# Patient Record
Sex: MALE | Race: WHITE | NOT HISPANIC OR LATINO | Employment: OTHER | URBAN - METROPOLITAN AREA
[De-identification: names, ages, dates, MRNs, and addresses within clinical notes are randomized per-mention and may not be internally consistent; named-entity substitution may affect disease eponyms.]

---

## 2018-03-20 ENCOUNTER — HISTORICAL (OUTPATIENT)
Dept: LAB | Facility: HOSPITAL | Age: 78
End: 2018-03-20

## 2018-03-20 LAB — PSA SERPL-MCNC: <0.01 NG/ML (ref 0–4)

## 2018-09-25 ENCOUNTER — HISTORICAL (OUTPATIENT)
Dept: ADMINISTRATIVE | Facility: HOSPITAL | Age: 78
End: 2018-09-25

## 2018-12-12 ENCOUNTER — HOSPITAL ENCOUNTER (OUTPATIENT)
Dept: ADMINISTRATIVE | Facility: HOSPITAL | Age: 78
End: 2018-12-14
Attending: INTERNAL MEDICINE | Admitting: INTERNAL MEDICINE

## 2018-12-12 LAB
ABS NEUT (OLG): 5.92 X10(3)/MCL (ref 2.1–9.2)
ALBUMIN SERPL-MCNC: 3.9 GM/DL (ref 3.4–5)
ALBUMIN/GLOB SERPL: 1.6 {RATIO}
ALP SERPL-CCNC: 98 UNIT/L (ref 50–136)
ALT SERPL-CCNC: 89 UNIT/L (ref 12–78)
AMPHET UR QL SCN: NORMAL
APTT PPP: 24.7 SECOND(S) (ref 24.8–36.9)
AST SERPL-CCNC: 56 UNIT/L (ref 15–37)
BARBITURATE SCN PRESENT UR: NORMAL
BASOPHILS # BLD AUTO: 0.1 X10(3)/MCL (ref 0–0.2)
BASOPHILS NFR BLD AUTO: 1 %
BENZODIAZ UR QL SCN: NORMAL
BILIRUB SERPL-MCNC: 1.4 MG/DL (ref 0.2–1)
BILIRUBIN DIRECT+TOT PNL SERPL-MCNC: 0.2 MG/DL (ref 0–0.2)
BILIRUBIN DIRECT+TOT PNL SERPL-MCNC: 1.2 MG/DL (ref 0–0.8)
BUN SERPL-MCNC: 21 MG/DL (ref 7–18)
CALCIUM SERPL-MCNC: 9.1 MG/DL (ref 8.5–10.1)
CANNABINOIDS UR QL SCN: NORMAL
CHLORIDE SERPL-SCNC: 103 MMOL/L (ref 98–107)
CHOLEST SERPL-MCNC: 140 MG/DL (ref 0–200)
CHOLEST/HDLC SERPL: 4.7 {RATIO} (ref 0–5)
CK SERPL-CCNC: 65 UNIT/L (ref 39–308)
CO2 SERPL-SCNC: 32 MMOL/L (ref 21–32)
COCAINE UR QL SCN: NORMAL
CREAT SERPL-MCNC: 1.1 MG/DL (ref 0.7–1.3)
CRP SERPL HS-MCNC: 2.93 MG/L (ref 0–3)
EOSINOPHIL # BLD AUTO: 0.2 X10(3)/MCL (ref 0–0.9)
EOSINOPHIL NFR BLD AUTO: 2 %
ERYTHROCYTE [DISTWIDTH] IN BLOOD BY AUTOMATED COUNT: 13.8 % (ref 11.5–17)
ERYTHROCYTE [SEDIMENTATION RATE] IN BLOOD: 2 MM/HR (ref 0–15)
EST. AVERAGE GLUCOSE BLD GHB EST-MCNC: 180 MG/DL
GLOBULIN SER-MCNC: 2.4 GM/DL (ref 2.4–3.5)
GLUCOSE SERPL-MCNC: 156 MG/DL (ref 74–106)
HBA1C MFR BLD: 7.9 % (ref 4.2–6.3)
HCT VFR BLD AUTO: 52.3 % (ref 42–52)
HDLC SERPL-MCNC: 30 MG/DL (ref 35–60)
HGB BLD-MCNC: 16.7 GM/DL (ref 14–18)
INR PPP: 1 (ref 0–1.3)
LDLC SERPL CALC-MCNC: 41 MG/DL (ref 0–129)
LYMPHOCYTES # BLD AUTO: 2.2 X10(3)/MCL (ref 0.6–4.6)
LYMPHOCYTES NFR BLD AUTO: 24 %
MCH RBC QN AUTO: 29.3 PG (ref 27–31)
MCHC RBC AUTO-ENTMCNC: 31.9 GM/DL (ref 33–36)
MCV RBC AUTO: 91.9 FL (ref 80–94)
MONOCYTES # BLD AUTO: 0.8 X10(3)/MCL (ref 0.1–1.3)
MONOCYTES NFR BLD AUTO: 8 %
NEUTROPHILS # BLD AUTO: 5.92 X10(3)/MCL (ref 2.1–9.2)
NEUTROPHILS NFR BLD AUTO: 64 %
OPIATES UR QL SCN: NORMAL
PCP UR QL: NEGATIVE
PH UR STRIP.AUTO: 7 [PH] (ref 5–7.5)
PLATELET # BLD AUTO: 205 X10(3)/MCL (ref 130–400)
PMV BLD AUTO: 10.4 FL (ref 9.4–12.4)
POTASSIUM SERPL-SCNC: 4.2 MMOL/L (ref 3.5–5.1)
PROT SERPL-MCNC: 6.3 GM/DL (ref 6.4–8.2)
PROTHROMBIN TIME: 13.3 SECOND(S) (ref 12.2–14.7)
RBC # BLD AUTO: 5.69 X10(6)/MCL (ref 4.7–6.1)
SODIUM SERPL-SCNC: 143 MMOL/L (ref 136–145)
SP GR FLD REFRACTOMETRY: 1.01 (ref 1–1.03)
TRIGL SERPL-MCNC: 343 MG/DL (ref 30–150)
TROPONIN I SERPL-MCNC: <0.02 NG/ML (ref 0.02–0.49)
VLDLC SERPL CALC-MCNC: 69 MG/DL
WBC # SPEC AUTO: 9.2 X10(3)/MCL (ref 4.5–11.5)

## 2018-12-13 LAB
ABS NEUT (OLG): 4.87 X10(3)/MCL (ref 2.1–9.2)
BASOPHILS # BLD AUTO: 0.1 X10(3)/MCL (ref 0–0.2)
BASOPHILS NFR BLD AUTO: 1 %
BUN SERPL-MCNC: 16 MG/DL (ref 7–18)
CALCIUM SERPL-MCNC: 9 MG/DL (ref 8.5–10.1)
CHLORIDE SERPL-SCNC: 103 MMOL/L (ref 98–107)
CO2 SERPL-SCNC: 30 MMOL/L (ref 21–32)
CREAT SERPL-MCNC: 0.7 MG/DL (ref 0.7–1.3)
CREAT/UREA NIT SERPL: 22.9
EOSINOPHIL # BLD AUTO: 0.2 X10(3)/MCL (ref 0–0.9)
EOSINOPHIL NFR BLD AUTO: 3 %
ERYTHROCYTE [DISTWIDTH] IN BLOOD BY AUTOMATED COUNT: 13.6 % (ref 11.5–17)
GLUCOSE SERPL-MCNC: 148 MG/DL (ref 74–106)
HCT VFR BLD AUTO: 50 % (ref 42–52)
HGB BLD-MCNC: 16.3 GM/DL (ref 14–18)
LYMPHOCYTES # BLD AUTO: 2.2 X10(3)/MCL (ref 0.6–4.6)
LYMPHOCYTES NFR BLD AUTO: 27 %
MCH RBC QN AUTO: 29.5 PG (ref 27–31)
MCHC RBC AUTO-ENTMCNC: 32.6 GM/DL (ref 33–36)
MCV RBC AUTO: 90.6 FL (ref 80–94)
MONOCYTES # BLD AUTO: 0.8 X10(3)/MCL (ref 0.1–1.3)
MONOCYTES NFR BLD AUTO: 9 %
NEUTROPHILS # BLD AUTO: 4.87 X10(3)/MCL (ref 2.1–9.2)
NEUTROPHILS NFR BLD AUTO: 60 %
PLATELET # BLD AUTO: 185 X10(3)/MCL (ref 130–400)
PMV BLD AUTO: 10.6 FL (ref 9.4–12.4)
POTASSIUM SERPL-SCNC: 3.9 MMOL/L (ref 3.5–5.1)
RBC # BLD AUTO: 5.52 X10(6)/MCL (ref 4.7–6.1)
SODIUM SERPL-SCNC: 141 MMOL/L (ref 136–145)
TSH SERPL-ACNC: 2.4 MIU/L (ref 0.36–3.74)
WBC # SPEC AUTO: 8.1 X10(3)/MCL (ref 4.5–11.5)

## 2019-01-28 ENCOUNTER — HISTORICAL (OUTPATIENT)
Dept: ADMINISTRATIVE | Facility: HOSPITAL | Age: 79
End: 2019-01-28

## 2019-01-28 LAB — POTASSIUM SERPL-SCNC: 4.7 MMOL/L (ref 3.5–5.1)

## 2021-01-29 ENCOUNTER — HISTORICAL (OUTPATIENT)
Dept: ADMINISTRATIVE | Facility: HOSPITAL | Age: 81
End: 2021-01-29

## 2021-01-29 LAB — POTASSIUM SERPL-SCNC: 6 MMOL/L (ref 3.5–5.1)

## 2021-11-09 ENCOUNTER — HISTORICAL (OUTPATIENT)
Dept: RADIOLOGY | Facility: HOSPITAL | Age: 81
End: 2021-11-09

## 2021-11-09 LAB — POC CREATININE: 0.8 MG/DL (ref 0.6–1.3)

## 2021-12-01 ENCOUNTER — HISTORICAL (OUTPATIENT)
Dept: ADMINISTRATIVE | Facility: HOSPITAL | Age: 81
End: 2021-12-01

## 2022-04-30 NOTE — OP NOTE
DATE OF SURGERY:9-25-18    SURGEON:  Viviana Mcguire MD    PREOPERATIVE DIAGNOSIS:  Nucleosclerotic cataract of the right eye.    POST OPERATIVE DIAGNOSIS:  Nucleosclerotic cataract of the right eye.    PROCEDURE:  Cataract extraction with intraocular lens implantation of the right eye.    ANESTHESIA:  Local plus MAC.    COMPLICATIONS:  None.    ESTIMATED BLOOD LOSS:  None.    After discussion of risks, benefits and alternative with the patient and family, informed consent was obtained by the patient in clinic including but not limited to bleeding, infection, decreased vision, loss of the eye, need for subsequent surgery.  The patient understands and wishes to proceed.    PROCEDURE IN DETAIL:    The patient was brought into the operating room and laid in supine manner.  After anesthesia was undertaken without complication, the operative eye and periorbital region were prepped and draped in usual manner for intraocular surgery while a speculum was placed in the operative eye for adequate exposure. A paracentesis incision was made at approximately 11 o'clock with a clear cornea at the limbus. Preservative free Lidocaine and epinephrine was injected into the anterior chamber followed by viscoelastic.  A triplanar cataract wound was then created approximately 7 o'clock through clear cornea at the limbus.  Curvilinear capsulorrhexis was created without complication.     BSS sonic cannula was used to hydrodissect the lens.  The lens was found to move freely within the capsular bag.  Lens was then removed in divide and conquer technique.  Remaining cortical material was removed with I&A handpiece.  A 20.5 diopter ZCBOO lens was then implanted into the capsular bag. The remaining viscoelastic was then removed with the irrigation aspiration handpiece. The wounds were hydrated and postop injections given.  The patient tolerated the procedure well.  Eyelid speculum was removed followed by pressure patch and shield.  The  patient was turned over to anesthesia in stable condition.

## 2022-04-30 NOTE — ED PROVIDER NOTES
Patient:   Junior Maynor             MRN: [id number]            FIN: [id number]               Age:   78 years     Sex:  Male     :  1940   Associated Diagnoses:   CVA (cerebral vascular accident)   Author:   Li OSEGUERA MD, Timi BONILLA      Basic Information   Time seen: Date & time 2018 16:47:00.   History source: Patient.   Arrival mode: Private vehicle, walking.   History limitation: None.   Additional information: Patient's physician(s): Viviana Mcguire MD, Chief Complaint from Nursing Triage Note : Chief Complaint   2018 16:44 CST     Chief Complaint           Pt wife states pt sent here by eye doctor for ct scan. Pt having double vision and headache since yesterday . No nausea or vomitting.  .      History of Present Illness   The patient presents with headache, 78-year-old white male with complaints of double vision and headache since yesterday.  Seen by Dr. Mcguire ophthalmologist today and referred here for CT head.  No focal neurological deficits.  Denies fever only for rigidity or vomiting.  Denies any injury or trauma . Faustina Marshall, NP SORT NOTE and     I, Dr. Jefferson, assumed care of this patient at 1816.  78 year old male with history of HTN, HLD, CAD, MI, and RA presents to the ED sent by ophthalmologist for head CT. Patient complains of sudden onset of double vision yesterday. He also reports slight headache. He reports thinking that blurred vision was due to straining eyes to see. Patient denies nausea, trouble speaking, new weakness, CP, and SOB. He reports that his gait is normal for him. Patient reports history of right eye cataract surgery. He denies contacts. Patient does not smoke. .  The onset was 1  days ago.  The course/duration of symptoms is constant.  Location: generalized. Radiating pain: none. The character of symptoms is Pain.  The degree at onset was minimal.  The degree at maximum was minimal.  The degree at present is minimal.  The exacerbating factor  is none.  The relieving factor is none.  Risk factors consist of hypertension.  Prior episodes: none.  Therapy today: doctor's office visit.  Preceding symptoms: none.  Associated symptoms: double vision, no nausea, no trouble speaking or walking, no new weakness, no CP, no SOB.        Review of Systems   Constitutional symptoms:  No fever, no chills, no sweats, no weakness.    Skin symptoms:  No rash,    Eye symptoms:  Double vision.   ENMT symptoms:  No ear pain,    Respiratory symptoms:  No shortness of breath, no orthopnea.    Cardiovascular symptoms:  No chest pain, no palpitations.    Gastrointestinal symptoms:  No abdominal pain, no nausea, no vomiting, no diarrhea.    Genitourinary symptoms:  No dysuria, no hematuria.    Musculoskeletal symptoms:  No back pain, no Muscle pain.    Neurologic symptoms:  Headache, No trouble speaking, no trouble walking, no new weakness.    Psychiatric symptoms:  No anxiety, no depression.    Allergy/immunologic symptoms:  No seasonal allergies, no food allergies.              Additional review of systems information: All other systems reviewed and otherwise negative.      Health Status   Allergies:    Allergic Reactions (Selected)  No Known Allergies.   Medications:  (Selected)   Documented Medications  Documented  Norco 7.5 mg-325 mg oral tablet: 1 tab(s), Oral, BID, 0 Refill(s)  Plavix 75 mg oral tablet: 75 mg = 1 tab(s), Oral, Daily, # 30 tab(s), 0 Refill(s)  amlodipine 5 mg oral tablet: 5 mg = 1 tab(s), Oral, BID, 0 Refill(s)  aspirin 81 mg oral tablet: 81 mg = 1 tab(s), Oral, Daily, # 30 tab(s), 0 Refill(s)  atorvastatin 10 mg oral tablet: 20 mg = 2 tab(s), Oral, Daily, 0 Refill(s)  diclofenac sodium 75 mg oral delayed release tablet: 75 mg = 1 tab(s), Oral, BID, # 60 tab(s), 0 Refill(s)  isosorbide MONOnitrate 30 mg oral tablet, Extended Release: 30 mg = 1 tab(s), Oral, qAM, 0 Refill(s)  losartan 100 mg oral tablet: 100 mg = 1 tab(s), Oral, At Bedtime, 0  Refill(s)  metoprolol tartrate 50 mg oral tab: 50 mg = 1 tab(s), Oral, BID, 0 Refill(s)  nitroglycerin 0.4 mg sublingual TAB: 0.4 mg = 1 tab(s), SL, q5min, PRN PRN for chest pain, 0 Refill(s).   Immunizations: Up to date.      Past Medical/ Family/ Social History   Medical history:    Resolved  Prostate cancer ([id number]):  Resolved.  CAD (coronary atherosclerotic disease) ([id number]):  Resolved.  MI, old ([id number]):  Resolved.  Hyperlipidemia ([id number]):  Resolved.  Hypertension (30959222):  Resolved.  Urinary incontinence ([id number]):  Resolved..   Surgical history:    99497 - RT CATARACT W/IOL 1 STA PHACO (Right) performed by Viviana Mcguire MD on 9/25/2018 at 78 Years.  Comments:  9/25/2018 13:00 - Erum Eagle RN  auto-populated from documented surgical case  Cervical Fusion Anterior (.) performed by Sreekanth Serrano MD on 8/5/2016 at 76 Years.  Comments:  8/5/2016 19:29 - Sami Mott RN  auto-populated from documented surgical case  Left Heart Cath w/COR Angio Ventriculography (None) performed by Jean Pierre Mina MD on 1/7/2016 at 75 Years.  Comments:  1/7/2016 19:37 - Mateus Ferro RN  auto-populated from documented surgical case  Place Drug Eluting Stent Perc Single (None) performed by Jean Pierre Mina MD on 1/7/2016 at 75 Years.  Comments:  1/7/2016 19:37 - Mateus Ferro RN  auto-populated from documented surgical case  Prostatectomy (SNOMED CT 044078030).  Cholecystectomy (SNOMED CT 12925282).  Tonsillectomy (SNOMED CT 529439903).  stent cardiac placement.  Bladder..   Family history:    No family history items have been selected or recorded..   Social history: Alcohol use: Denies, Tobacco use: Denies, Drug use: Denies, Occupation: On disability.      Physical Examination               Vital Signs   Vital Signs   12/12/2018 16:44 CST     Temperature Oral          36.5 DegC                             Temperature  Oral (calculated)             97.70 DegF                             Peripheral Pulse Rate     64 bpm                             Respiratory Rate          18 br/min                             SpO2                      96 %                             Oxygen Therapy            Room air                             Systolic Blood Pressure   157 mmHg  HI                             Diastolic Blood Pressure  77 mmHg  .   Measurements   12/12/2018 16:44 CST     Weight Dosing             93 kg                             Weight Measured and Calculated in Lbs     205.03 lb                             Weight Estimated          93 kg                             Height/Length Dosing      165 cm                             Height/Length Estimated   165 cm                             Body Mass Index Estimated 34.16 kg/m2  .   Basic Oxygen Information   12/12/2018 16:44 CST     SpO2                      96 %                             Oxygen Therapy            Room air  .   General:  Alert, no acute distress.    Skin:  Warm, pink, intact, moist, no rash.    Head:  Normocephalic, atraumatic.    Neck:  Supple.   Eye:  Pupils are equal, round and reactive to light, normal conjunctiva, Unable to abduct right eye.    Cardiovascular:  Regular rate and rhythm, No murmur, Normal peripheral perfusion, No edema.    Respiratory:  Lungs are clear to auscultation, respirations are non-labored, breath sounds are equal, Symmetrical chest wall expansion.    Gastrointestinal:  Soft, Nontender, Non distended, Normal bowel sounds.    Musculoskeletal:  Normal ROM, normal strength.    Neurological:  Alert and oriented to person, place, time, and situation, No focal neurological deficit observed, CN II-XII intact, normal sensory observed, normal motor observed, normal speech observed, normal coordination observed.    Lymphatics:  No lymphadenopathy.   Psychiatric:  Cooperative, appropriate mood & affect, normal judgment.       Medical Decision  Making   Documents reviewed:  Emergency department nurses' notes.   Orders  Launch Order Profile (Selected)   Inpatient Orders  Completed  Automated Diff: STAT collect, 12/12/18 17:14:00 CST, Blood, Collected, Stop date 12/12/18 17:14:00 CST, Lab Collect, Print Label By Order Location, 12/12/18 16:46:00 CST  CBC - includes Diff: STAT collect, 12/12/18 17:14:41 CST, BLOOD, Collected, Stop date 12/12/18 17:14:00 CST, Lab Collect  CMP: STAT collect, 12/12/18 17:14:41 CST, BLOOD, Collected, Stop date 12/12/18 17:14:00 CST, Lab Collect  CT Head W/O Contrast: Stat, 12/12/18 16:46:00 CST, Other (please specify), double vision, None, Stretcher, Patient Has IV?, Rad Type, Schedule this test, 12/12/18 16:46:00 CST  Estimated Glomerular Filtration Rate: STAT collect, 12/12/18 17:14:00 CST, Blood, Collected, Stop date 12/12/18 17:14:00 CST, Lab Collect, Print Label By Order Location, 12/12/18 16:46:00 CST  PT: STAT collect, 12/12/18 17:14:41 CST, BLOOD, Collected, Stop date 12/12/18 17:14:00 CST, Lab Collect  PTT: STAT collect, 12/12/18 17:14:41 CST, BLOOD, Collected, Stop date 12/12/18 17:14:00 CST, Lab Collect.   Electrocardiogram:  Normal sinus rhythm, No ST-T changes, no ectopy, normal SD & QRS intervals, EP Interp.    Results review:  Lab results : Lab View   12/12/2018 17:14 CST     Sodium Lvl                143 mmol/L                             Potassium Lvl             4.2 mmol/L                             Chloride                  103 mmol/L                             CO2                       32.0 mmol/L                             Calcium Lvl               9.1 mg/dL                             Glucose Lvl               156 mg/dL  HI                             BUN                       21.0 mg/dL  HI                             Creatinine                1.10 mg/dL                             eGFR-AA                   >60 mL/min/1.73 m2  NA                             eGFR-KENAN                  >60 mL/min/1.73 m2   NA                             Bili Total                1.4 mg/dL  HI                             Bili Direct               0.20 mg/dL                             Bili Indirect             1.20 mg/dL  HI                             AST                       56 unit/L  HI                             ALT                       89 unit/L  HI                             Alk Phos                  98 unit/L                             Total Protein             6.3 gm/dL  LOW                             Albumin Lvl               3.90 gm/dL                             Globulin                  2.40 gm/dL                             A/G Ratio                 1.6  NA                             PT                        13.3 second(s)                             INR                       1.0                             PTT                       24.7 second(s)  LOW                             WBC                       9.2 x10(3)/mcL                             RBC                       5.69 x10(6)/mcL                             Hgb                       16.7 gm/dL                             Hct                       52.3 %  HI                             Platelet                  205 x10(3)/mcL                             MCV                       91.9 fL                             MCH                       29.3 pg                             MCHC                      31.9 gm/dL  LOW                             RDW                       13.8 %                             MPV                       10.4 fL                             Abs Neut                  5.92 x10(3)/mcL                             Neutro Auto               64 %  NA                             Lymph Auto                24 %  NA                             Mono Auto                 8 %  NA                             Eos Auto                  2 %  NA                             Abs Eos                   0.2 x10(3)/mcL                             Basophil  Auto             1 %  NA                             Abs Neutro                5.92 x10(3)/mcL                             Abs Lymph                 2.2 x10(3)/mcL                             Abs Mono                  0.8 x10(3)/mcL                             Abs Baso                  0.1 x10(3)/mcL  .   Radiology results:  Rad Results (ST)  < 12 hrs   Accession: [id number]  Order: CT Head W/O Contrast  Report Dt/Tm: 12/12/2018 17:43  Report:      EXAM: CT Head W/O Contrast     INDICATION: double vision; headache     TECHNIQUE: Axial computed tomographic imaging of the head is obtained  without the administration of intravenous contrast. Automated exposure  control is utilized to reduce patient radiation dose.     COMPARISON: MRI brain on 11/17/2014     FINDINGS: The ventricles, sulci and cisterns are normal in size and  contour without hydrocephalus. There is no acute intracranial  hemorrhage, mass effect or midline shift. No large territorial  hypodensity or abnormal extra-axial fluid collection is identified.  Mild scattered patchy hypodensities are noted in the periventricular  and deep white matter. A partially calcified mass along the left  aspect of the mid falx measures up to 0.9 cm in diameter and  correlates with the previously demonstrated meningioma. The cerebellar  tonsils are normal in position. Calcified atherosclerotic plaque is  noted along the bilateral carotid siphons. A right lens replacement is  noted. The orbits are otherwise symmetric. There is a mucous retention  cyst or polyp in the left frontal sinus. The remaining visualized  cranial sinuses and mastoid air cells are well aerated. There is no  depressed calvarial fracture.        IMPRESSION:   1. No acute intracranial abnormality identified.  2. Mild chronic microvascular ischemic changes.  3. Partially calcified left parafalcine meningioma.    .      Reexamination/ Reevaluation   Time: 12/12/2018 20:11:00 .   Interventions: Patient  resting comfortably discussed case with Dr. Shepard recommends admission to hospitalist for formal stroke evaluation.      Impression and Plan   Diagnosis   CVA (cerebral vascular accident) ([id number] I63.9)   Plan   Condition: Improved, Stable.    Disposition: Medically cleared, Admit time  12/12/2018 20:13:00, Admit to Inpatient Unit.    Follow up with: Primary Care Physician.    Counseled: Patient, Regarding diagnosis, Regarding diagnostic results, Regarding treatment plan, Regarding prescription, Patient indicated understanding of instructions.    Notes: I, Divya Brink, acted solely as a scribe for and in the presence of Dr. Jefferson who performed the service.,       This scribes note accurately reflects the work done by me I have reviewed the note and personally performed a history and physical and agree with all the documentation and findings.

## 2022-04-30 NOTE — H&P
Patient:   Junior Maynor             MRN: [id number]            FIN: [id number]               Age:   78 years     Sex:  Male     :  1940   Associated Diagnoses:   CAD (coronary atherosclerotic disease); CVA (cerebral vascular accident); Double vision; Headache; Hyperlipidemia; Hypertension   Author:   Harish Gonzalez MD      Basic Information   Admit information:  Double vision headache htn .    Source of history:  Self.    Referral source:  Self.    History limitation:  None.       Chief Complaint   2018 16:44 CST     Pt wife states pt sent here by eye doctor for ct scan. Pt having double vision and headache since yesterday . No nausea or vomitting.        History of Present Illness             The patient presents with A 78 year old  male presented with c/o headache with double vision with h/o htn saw his ophthalmologist and was asked to get a ctscan in view of these symptoms he was evaluated in the er and admitted for tia vs cva with neuro consult .        Review of Systems   Has per HPI otherwise all systems reviewed and negative.   Constitutional:  Weakness, Fatigue, Decreased activity.    Eye:  Double vision, Visual disturbances.    Ear/Nose/Mouth/Throat:  Negative.    Respiratory:  Negative.    Cardiovascular:  Negative.    Gastrointestinal:  Negative.    Genitourinary:  Negative.    Hematology/Lymphatics:  Negative.    Endocrine:  Negative.    Immunologic:  Malaise.    Musculoskeletal:  Decreased range of motion.    Integumentary:  Negative.    Neurologic:  Headache.    Psychiatric:  Negative.    All other systems are negative      Health Status   Allergies:    Allergic Reactions (All)  No Known Allergies,    Allergies (1) Active Reaction  No Known Allergies None Documented   Current medications:  (Selected)   Inpatient Medications  Ordered  Lovenox 40 mg/0.4 mL subcutaneous solution: 40 mg, form: Injection, Subcutaneous, Daily, first dose 18 9:00:00 CST  Plavix 75 mg  oral tablet: 75 mg, form: Tab, Oral, Daily, first dose 12/13/18 9:00:00 CST  amlodipine 5 mg oral tablet: 5 mg, form: Tab, Oral, BID, first dose 12/12/18 21:00:00 CST  aspirin 325 mg oral tablet: 325 mg, form: Tab, Oral, Daily, first dose 12/12/18 21:58:00 CST, 4 chew tab = 5 grains  atorvastatin: 20 mg, form: Tab, Oral, Daily, first dose 12/13/18 17:00:00 CST  hydrALAZINE 20 mg/mL injectable solution: 10 mg, form: Injection, IV Push, q4hr PRN for hypertension, first dose 12/12/18 18:52:00 CST, STAT, SBP >220; DBP >120; (Use only if labetalol fails to maintain blood pressure parameters)  isosorbide MONOnitrate 30 mg oral tablet, Extended Release: 30 mg, form: Tab-ER, Oral, qAM, first dose 12/13/18 6:00:00 CST  labetalol: 10 mg, form: Soln, IV Push, q10min PRN for hypertension, first dose 12/12/18 18:52:00 CST, STAT, SBP > 220 or DBP > 120 over 1-2 minutes, not exceed 300 mg in 24-hours  losartan: 100 mg, form: Tab, Oral, At Bedtime, first dose 12/12/18 21:00:00 CST  metoprolol tartrate 50 mg oral tab: 50 mg, form: Tab, Oral, BID, first dose 12/12/18 21:00:00 CST  Documented Medications  Documented  ATORVASTATIN 20 MG TABLET: 20 mg = 1 tab(s), Daily  FLUZONE HIGH-DOSE 2018-19 SYR:   KETOROLAC 0.4% OPHTH SOLUTION:   MOXIFLOXACIN 0.5% EYE DROPS:   Norco 7.5 mg-325 mg oral tablet: 1 tab(s), Oral, BID, 0 Refill(s)  PREDNISOLONE AC 1% EYE DROP:   Plavix 75 mg oral tablet: 75 mg = 1 tab(s), Oral, Daily, # 30 tab(s), 0 Refill(s)  amlodipine 5 mg oral tablet: 5 mg = 1 tab(s), Oral, BID, 0 Refill(s)  aspirin 81 mg oral tablet: 81 mg = 1 tab(s), Oral, Daily, # 30 tab(s), 0 Refill(s)  atorvastatin 10 mg oral tablet: 20 mg = 2 tab(s), Oral, Daily, 0 Refill(s)  diclofenac sodium 75 mg oral delayed release tablet: 75 mg = 1 tab(s), Oral, BID, # 60 tab(s), 0 Refill(s)  isosorbide MONOnitrate 30 mg oral tablet, Extended Release: 30 mg = 1 tab(s), Oral, qAM, 0 Refill(s)  losartan 100 mg oral tablet: 100 mg = 1 tab(s), Oral, At  Bedtime, 0 Refill(s)  metoprolol tartrate 50 mg oral tab: 50 mg = 1 tab(s), Oral, BID, 0 Refill(s)  nitroglycerin 0.4 mg sublingual TAB: 0.4 mg = 1 tab(s), SL, q5min, PRN PRN for chest pain, 0 Refill(s),    Medications (10) Active  Scheduled: (8)  amlodipine 5 mg Tab UD  5 mg 1 tab(s), Oral, BID  aspirin 325 mg Tab  325 mg 1 tab(s), Oral, Daily  atorvastatin 10 mg Tab UD  20 mg 2 tab(s), Oral, Daily  clopidogrel 75 mg Tab UD  75 mg 1 tab(s), Oral, Daily  enoxaparin 40mg/0.4ml Inj  40 mg 0.4 mL, Subcutaneous, Daily  isosorbide mononitrate 30 mg CR  UD  30 mg 1 tab(s), Oral, qAM  losartan 25 mg Tab UD  100 mg 4 tab(s), Oral, At Bedtime  metoprolol tartrate 50 mg Tab UD  50 mg 1 tab(s), Oral, BID  Continuous: (0)  PRN: (2)  hydrALAzine 20 mg/ml Inj- 1 mL  10 mg 0.5 mL, IV Push, q4hr  labetalol 5 mg/mL 20mL vial  10 mg 2 mL, IV Push, q10min   Problem list:    All Problems  Obesity / SNOMED CT 7598092406 / Probable  Osteoarthritis / SNOMED CT 1374094543 / Confirmed  Stent / SNOMED CT 615709906 / Confirmed,    Active Problems (3)  Obesity   Osteoarthritis   Stent       Histories   Past Medical History:    Resolved  Prostate cancer ([id number]):  Resolved.  CAD (coronary atherosclerotic disease) ([id number]):  Resolved.  MI, old ([id number]):  Resolved.  Hyperlipidemia (93806157):  Resolved.  Hypertension (37590378):  Resolved.  Urinary incontinence ([id number]):  Resolved.   Family History:    No family history items have been selected or recorded., NS   Procedure history:    12818 - RT CATARACT W/IOL 1 STA PHACO (Right) on 9/25/2018 at 78 Years.  Comments:  9/25/2018 13:00 - Shagufta RAI, Erum JAIN  auto-populated from documented surgical case  Cervical Fusion Anterior (.) on 8/5/2016 at 76 Years.  Comments:  8/5/2016 19:29 - Tiera RAI, Sami Jackson  auto-populated from documented surgical case  Left Heart Cath w/COR Angio Ventriculography (None)  on 1/7/2016 at 75 Years.  Comments:  1/7/2016 19:37 - Mateus Ferro RN  auto-populated from documented surgical case  Place Drug Eluting Stent Perc Single (None) on 1/7/2016 at 75 Years.  Comments:  1/7/2016 19:37 - Mateus Ferro RN  auto-populated from documented surgical case  Prostatectomy ([id number]).  Cholecystectomy (66792172).  Tonsillectomy ([id number]).  stent cardiac placement.  Bladder.   Social History        Social & Psychosocial Habits    Alcohol  09/16/2012 Risk Assessment: Denies Alcohol Use    01/06/2016  Use: Current    Type: Liquor    Frequency: 1-2 times per month    Substance Abuse  09/16/2012 Risk Assessment: Denies Substance Abuse    Tobacco  09/16/2012 Risk Assessment: Denies Tobacco Use    07/26/2016  Use: Never smoker.        Physical Examination   Vital Signs   12/12/2018 20:30 CST     Peripheral Pulse Rate     64 bpm                             SpO2                      93 %  LOW                             Oxygen Therapy            Room air                             Systolic Blood Pressure   153 mmHg  HI                             Diastolic Blood Pressure  82 mmHg                             Mean Arterial Pressure, Cuff              106 mmHg    12/12/2018 20:00 CST     Peripheral Pulse Rate     65 bpm                             SpO2                      97 %                             Oxygen Therapy            Room air                             Systolic Blood Pressure   154 mmHg  HI                             Diastolic Blood Pressure  85 mmHg                             Mean Arterial Pressure, Cuff              108 mmHg    12/12/2018 19:30 CST     Peripheral Pulse Rate     66 bpm                             SpO2                      96 %                             Oxygen Therapy            Room air                             Systolic Blood Pressure   147 mmHg  HI                             Diastolic Blood Pressure  82 mmHg                             Mean Arterial Pressure, Cuff               104 mmHg    12/12/2018 19:01 CST     Oxygen Therapy            Room air                             Systolic Blood Pressure   181 mmHg  HI                             Diastolic Blood Pressure  84 mmHg                             Mean Arterial Pressure, Cuff              116 mmHg    12/12/2018 18:58 CST     Peripheral Pulse Rate     69 bpm                             Respiratory Rate          18 br/min                             SpO2                      98 %                             Oxygen Therapy            Room air                             Systolic Blood Pressure   151 mmHg  HI                             Diastolic Blood Pressure  75 mmHg                             Mean Arterial Pressure, Cuff              100 mmHg    12/12/2018 16:44 CST     Temperature Oral          36.5 DegC                             Temperature Oral (calculated)             97.70 DegF                             Peripheral Pulse Rate     64 bpm                             Respiratory Rate          18 br/min                             SpO2                      96 %                             Oxygen Therapy            Room air                             Systolic Blood Pressure   157 mmHg  HI                             Diastolic Blood Pressure  77 mmHg        Vital Signs (last 24 hrs)_____  Last Charted___________  Temp Oral     36.5 DegC  (DEC 12 16:44)  Heart Rate Peripheral   64 bpm  (DEC 12 20:30)  Resp Rate         18 br/min  (DEC 12 18:58)  SBP      H 153mmHg  (DEC 12 20:30)  DBP      82 mmHg  (DEC 12 20:30)  SpO2      L 93%  (DEC 12 20:30)   Intake and Output   Fluid Balance Primitives   9/25/2018 7:00 CDT       Oral Intake               240 mL    8/6/2016 12:44 CDT       LIAN Neck                                                  Surgical Drain, Tube Output:          15 mL    8/6/2016 7:00 CDT        Oral Intake               360 mL                             Urine Catheter            250 mL                              Stool Count               0    8/6/2016 4:00 CDT        LIAN Neck                                                  Surgical Drain, Tube Output:          30 mL        General:  Alert and oriented, No acute distress.    Eye:  Pupils are equal, round and reactive to light, Normal conjunctiva.         Pupil: Both eyes, Equal.    HENT:  Normocephalic, Normal hearing, Oral mucosa is moist.    Neck:  Supple, Non-tender, No carotid bruit.    Respiratory:  Lungs are clear to auscultation, Respirations are non-labored, Breath sounds are equal.    Cardiovascular:  Normal rate, Regular rhythm, No murmur.    Gastrointestinal:  Soft, Non-tender, Non-distended, Normal bowel sounds.    Genitourinary:  No costovertebral angle tenderness.    Lymphatics:  No lymphadenopathy neck, axilla, groin.    Musculoskeletal:  Normal range of motion, Normal strength, No tenderness, No swelling.    Integumentary:  Warm, Dry, Intact.    Neurologic:  Alert, Oriented, Normal sensory, No focal deficits.    Cognition and Speech:  Oriented, Speech clear and coherent, Functional cognition intact.    Psychiatric:  Cooperative, Appropriate mood & affect, Normal judgment.       Review / Management   Laboratory Results   Today's Lab Results : PowerNote Discrete Results   12/12/2018 18:52 CST     U pH                      7.0                             U Spec Grav               1.010                             U Amph Scr                NEG                             U Aarti Scr                NEG                             U Benzodia Scr            NEG                             U Cannab Scr              NEG                             U Cocaine Scr             NEG                             U Opiate Scr              NEG                             U Phencyclidine Scr       Negative    12/12/2018 17:14 CST     WBC                       9.2 x10(3)/mcL                             RBC                       5.69 x10(6)/mcL                             Hgb                        16.7 gm/dL                             Hct                       52.3 %  HI                             Platelet                  205 x10(3)/mcL                             MCV                       91.9 fL                             MCH                       29.3 pg                             MCHC                      31.9 gm/dL  LOW                             RDW                       13.8 %                             MPV                       10.4 fL                             Abs Neut                  5.92 x10(3)/mcL                             Neutro Auto               64 %  NA                             Lymph Auto                24 %  NA                             Mono Auto                 8 %  NA                             Eos Auto                  2 %  NA                             Abs Eos                   0.2 x10(3)/mcL                             Basophil Auto             1 %  NA                             Abs Neutro                5.92 x10(3)/mcL                             Abs Lymph                 2.2 x10(3)/mcL                             Abs Mono                  0.8 x10(3)/mcL                             Abs Baso                  0.1 x10(3)/mcL                             Sed Rate                  2 mm/hr                             PT                        13.3 second(s)                             INR                       1.0                             PTT                       24.7 second(s)  LOW                             Sodium Lvl                143 mmol/L                             Potassium Lvl             4.2 mmol/L                             Chloride                  103 mmol/L                             CO2                       32.0 mmol/L                             Calcium Lvl               9.1 mg/dL                             Glucose Lvl               156 mg/dL  HI                             EAG                       180 mg/dL  NA                              BUN                       21.0 mg/dL  HI                             Creatinine                1.10 mg/dL                             eGFR-AA                   >60 mL/min/1.73 m2  NA                             eGFR-KENAN                  >60 mL/min/1.73 m2  NA                             Bili Total                1.4 mg/dL  HI                             Bili Direct               0.20 mg/dL                             Bili Indirect             1.20 mg/dL  HI                             AST                       56 unit/L  HI                             ALT                       89 unit/L  HI                             Alk Phos                  98 unit/L                             Total Protein             6.3 gm/dL  LOW                             Albumin Lvl               3.90 gm/dL                             Globulin                  2.40 gm/dL                             A/G Ratio                 1.6  NA                             Hgb A1c                   7.9 %  HI                             CRP High Sens             2.93 mg/L                             Chol                      140 mg/dL                             HDL                       30 mg/dL  LOW                             Trig                      343 mg/dL  HI                             LDL                       41 mg/dL                             Chol/HDL                  4.7                             VLDL                      69 mg/dL  NA                             Total CK                  65 unit/L                             Troponin-I                <0.02 ng/mL        Radiology results   Rad Results (ST)   Accession: [id number]  Order: CT Head W/O Contrast  Report Dt/Tm: 12/12/2018 17:43  Report:      EXAM: CT Head W/O Contrast     INDICATION: double vision; headache     TECHNIQUE: Axial computed tomographic imaging of the head is obtained  without the administration of intravenous contrast. Automated exposure  control is  utilized to reduce patient radiation dose.     COMPARISON: MRI brain on 11/17/2014     FINDINGS: The ventricles, sulci and cisterns are normal in size and  contour without hydrocephalus. There is no acute intracranial  hemorrhage, mass effect or midline shift. No large territorial  hypodensity or abnormal extra-axial fluid collection is identified.  Mild scattered patchy hypodensities are noted in the periventricular  and deep white matter. A partially calcified mass along the left  aspect of the mid falx measures up to 0.9 cm in diameter and  correlates with the previously demonstrated meningioma. The cerebellar  tonsils are normal in position. Calcified atherosclerotic plaque is  noted along the bilateral carotid siphons. A right lens replacement is  noted. The orbits are otherwise symmetric. There is a mucous retention  cyst or polyp in the left frontal sinus. The remaining visualized  cranial sinuses and mastoid air cells are well aerated. There is no  depressed calvarial fracture.        IMPRESSION:   1. No acute intracranial abnormality identified.  2. Mild chronic microvascular ischemic changes.  3. Partially calcified left parafalcine meningioma.       Condition:  Guarded.       Impression and Plan   Diagnosis     CAD (coronary atherosclerotic disease) (BXW90-DL I25.10).     CVA (cerebral vascular accident) (VOH56-ZF I63.9).     Double vision (RUS55-SY H53.2).     Headache (PNED [id number]).     Hyperlipidemia (MUK71-VX E78.5).     Hypertension (IBU41-QN I10).     Course:  Progressing as expected.    Orders      (Selected)   Inpatient Orders  Ordered  Admission Assessment Adult: 12/12/18 19:18:12 CST, Stop date 12/12/18 19:18:12 CST  Admission History Adult: 12/12/18 19:18:11 CST, Stop date 12/12/18 19:18:11 CST  Admit as Inpatient: 12/12/18 19:08:00 CST, Carlos CANO, [facility] Neuro, No  Basic Admission Information Adult: 12/12/18 19:18:11 CST, Stop date 12/12/18 19:18:11  CST  Bedrest: 18 18:52:00 CST, Constant order, 24 hours, Head of Bed Flat, if contraindicated elevate 15-30 degrees  Capacity Management Bed Request: 18 19:09:11 CST, Neuro  Cardiac Monitorin18 18:52:00 CST, Constant Order  Consult to Neurology Service On Call: 18 18:52:00 CST, CVA workup, Devyn CANO, Conrado BENTON  Discharge Planning Ongoing Assessment: 12/15/18 9:00:00 CST, q3day  Echocardiogram Complete Adult: 18 18:52:00 CST, Routine, Stroke, Stop date 18 18:52:00 CST, Stretcher, Patient has IV, Standard Precautions, CVA (cerebral vascular accident)  If onset of symptoms < 6 hours and NIH Stroke Scale is 6 or greater consult interventional neurolog...: 18 18:52:00 CST, If onset of symptoms < 6 hours and NIH Stroke Scale is 6 or greater consult interventional neurology STAT, if available (see physician on call ER schedule) for possible endovascular therapy.  Immunizations Quality Measures: 18 19:18:12 CST, qShift  Initiate routine bowel program if no BM by Day 3: 18 18:52:00 CST, Initiate routine bowel program if no BM by Day 3  Intake and Output: 18 18:52:00 CST, qShift  Lovenox 40 mg/0.4 mL subcutaneous solution: 40 mg, form: Injection, Subcutaneous, Daily, first dose 18 9:00:00 CST  MD to Nurse: 18 18:52:41 CST, If Pt has not voided within 6 hours after removing urinary cath, assess for urinary retention using bladder scanner; if volume > 300mL, in and out cath patient.  Then, if patient has not voided within 6 hours after the in and ou...  [facility] - Swallow Screening Tool: 18 18:52:00 CST, Stop date 18 18:52:00 CST, Complete prior to administration of PO medications, nutrition and hydration  Meal Supervision: 18 19:52:47 CST  NIH Stroke Scale Assessment: 18 18:52:00 CST, Once, Stop date 18 18:52:00 CST, 18 18:52:00 CST  NIH Stroke Scale Assessment: 18 18:52:00 CST, Once, Stop date 18 18:52:00  CST, AT DISCHARGE, 18 18:52:00 CST  NPO: 18 18:52:00 CST, For 24 hours, May have PO medications if passes the [facility] Swallow Screening Tool.  After 24 hours, contact attending for diet orders., CM NPO  Neuro Checks: 18 18:52:00 CST, q4hr, 18 19:00:00 CST  Notify attending physician of patients room number and as needed with any problems and/or question...: 18 18:52:00 CST, Notify attending physician of patients room number and as needed with any problems and/or questions  Occupational Therapy Eval and Treat: 18 18:52:00 CST, Mobility, Stop date 18 18:52:00 CST, Stretcher, Patient has IV, Standard Precautions  Oxygen Therapy: 18 18:52:00 CST, Nasal Cannula, PRN, 2L, titrate to maintain O2 saturation > 94%.  Do NOT remove O2, CM Oxygen  Physical Therapy Eval and Treat: 18 18:52:00 CST, Mobility, Patient has IV, Standard Precautions  Plavix 75 mg oral tablet: 75 mg, form: Tab, Oral, Daily, first dose 18 9:00:00 CST  Reason IV Thrombolytic Therapy Not Initiated: 18 18:52:00 CST, Unknown Time of Onset  Risk Screenin18 19:18:12 CST, BID  Speech Language Pathology Eval and Treat: 18 18:52:00 CST, MBS as indicated, Evaluation and Treatment, Stretcher, Patient has IV, Standard Precautions, CVA (cerebral vascular accident)  Stroke Education: 18 18:52:00 CST, BID  Stroke Quality Measures: 18 18:52:00 CST, Stop date 18 18:52:00 CST  US NIVA Carotid Bilateral: 18 18:52:00 CST, Routine, Stop date 18 18:52:00 CST, Stretcher, Patient has IV, Standard Precautions, CVA (cerebral vascular accident)  Urinary Catheter Discontinue: 18 18:52:00 CST, if present on Day 2, straight cath if not voided after 8 hours  Use a cooling blanket for temp > 38 ° C (100.5° F), when not controlled by acetaminophen: 18 18:52:00 CST, Use a cooling blanket for temp > 38 ° C (100.5° F), when not controlled by acetaminophen  VTE Quality  Measures: 12/12/18 19:18:12 CST, qShift  Vital Signs: 12/12/18 18:52:00 CST, q4hr  Weight: 12/12/18 18:52:00 CST, Daily at 0500, Bed scale or standing weight on admission  amlodipine 5 mg oral tablet: 5 mg, form: Tab, Oral, BID, first dose 12/12/18 21:00:00 CST  aspirin 325 mg oral tablet: 325 mg, form: Tab, Oral, Daily, first dose 12/12/18 21:58:00 CST, 4 chew tab = 5 grains  atorvastatin: 20 mg, form: Tab, Oral, Daily, first dose 12/13/18 17:00:00 CST  hydrALAZINE 20 mg/mL injectable solution: 10 mg, form: Injection, IV Push, q4hr PRN for hypertension, first dose 12/12/18 18:52:00 CST, STAT, SBP >220; DBP >120; (Use only if labetalol fails to maintain blood pressure parameters)  isosorbide MONOnitrate 30 mg oral tablet, Extended Release: 30 mg, form: Tab-ER, Oral, qAM, first dose 12/13/18 6:00:00 CST  labetalol: 10 mg, form: Soln, IV Push, q10min PRN for hypertension, first dose 12/12/18 18:52:00 CST, STAT, SBP > 220 or DBP > 120 over 1-2 minutes, not exceed 300 mg in 24-hours  losartan: 100 mg, form: Tab, Oral, At Bedtime, first dose 12/12/18 21:00:00 CST  metoprolol tartrate 50 mg oral tab: 50 mg, form: Tab, Oral, BID, first dose 12/12/18 21:00:00 CST  Ordered (Exam Ordered)  MR Angio Head W/O Contrast: Routine, 12/12/18 18:52:00 CST, Stroke, None, Stretcher, Patient Has IV?, TODAY, if after hours in AM, Rad Type, CVA (cerebral vascular accident), 12/12/18 18:52:00 CST  MR Angio Neck W Contrast: Routine, 12/12/18 18:52:00 CST, Other (please specify), Stroke, None, Stretcher, Patient Has IV?, Today, if after hours in AM, Rad Type, CVA (cerebral vascular accident), 12/12/18 18:52:00 CST  MRI Brain W W/O Contrast: Routine, 12/12/18 18:52:00 CST, Other (please specify), CVA, None, Stretcher, Patient Has IV?, Today, if after hours in AM, Rad Type, CVA (cerebral vascular accident), 12/12/18 18:52:00 CST  Ordered (Scheduled)  BMP: Routine collect, 12/13/18 5:00:00 CST, Blood, Stop date 12/13/18 5:00:00 CST, Lab  Collect, in AM, Print Label By Order Location, 12/13/18 5:00:00 CST  CBC w/ Auto Diff: Routine collect, 12/13/18 5:00:00 CST, Blood, Stop date 12/13/18 5:00:00 CST, Lab Collect, Print Label By Order Location, 12/13/18 5:00:00 CST  TSH: Routine collect, 12/13/18 5:00:00 CST, Blood, Stop date 12/13/18 5:00:00 CST, Lab Collect, Print Label By Order Location, 12/13/18 5:00:00 CST.     TIA vs CVA  - admit to he floor  - tele  - neuro consult  - asa and plavix  - bp control  - neuro checks    HTN  - bp monitoring    CAD  - stable  .     Education and Follow-up:          Counseled: Patient, Regarding diagnosis, Regarding treatment, Regarding medications.         Discharge Planning: Plan to discharge ( To home ), Diplopia.       Quality Measures

## 2022-04-30 NOTE — DISCHARGE SUMMARY
Patient:   Junior Maynor             MRN: [id number]            FIN: [id number]               Age:   78 years     Sex:  Male     :  1940   Associated Diagnoses:   Blurry vision; CAD (coronary artery disease); CAD (coronary atherosclerotic disease); CVA (cerebral vascular accident); Diplopia; Double vision; Headache; HLD (hyperlipidemia); HTN (hypertension); Hyperlipidemia; Hypertension; Meningioma; Prostate CA; Suspected cerebrovascular accident (CVA)   Author:   Hansa Cadena MD      Results Review   General results   Most recent results   Discrete results only   2018 3:24 CST      WBC                       8.1 x10(3)/mcL                             RBC                       5.52 x10(6)/mcL                             Hgb                       16.3 gm/dL                             Hct                       50.0 %                             Platelet                  185 x10(3)/mcL                             MCV                       90.6 fL                             MCH                       29.5 pg                             MCHC                      32.6 gm/dL  LOW                             RDW                       13.6 %                             MPV                       10.6 fL                             Abs Neut                  4.87 x10(3)/mcL                             Neutro Auto               60 %  NA                             Lymph Auto                27 %  NA                             Mono Auto                 9 %  NA                             Eos Auto                  3 %  NA                             Abs Eos                   0.2 x10(3)/mcL                             Basophil Auto             1 %  NA                             Abs Neutro                4.87 x10(3)/mcL                             Abs Lymph                 2.2 x10(3)/mcL                             Abs Mono                  0.8 x10(3)/mcL                             Abs Baso                   0.1 x10(3)/mcL                             Sodium Lvl                141 mmol/L                             Potassium Lvl             3.9 mmol/L                             Chloride                  103 mmol/L                             CO2                       30.0 mmol/L                             Calcium Lvl               9.0 mg/dL                             Glucose Lvl               148 mg/dL  HI                             BUN                       16.0 mg/dL                             Creatinine                0.70 mg/dL                             BUN/Creat Ratio           22.9  NA                             eGFR-AA                   >60 mL/min/1.73 m2  NA                             eGFR-KENAN                  >60 mL/min/1.73 m2  NA                             TSH                       2.400 mIU/L           Discharge Information   Discharge Summary Information:  Admitted  12/12/2018, Discharged  12/14/2018.         Admitting physician: Harish Gonzalez MD.         Referring physician for admission: Timi Jefferson III, MD         Consulting physician: Angus Storey MD.         Discharge diagnosis: Blurry vision (GRG82-NW H53.8), CAD (coronary artery disease) (LKU17-AG I25.10), CAD (coronary atherosclerotic disease) (QEN04-HY I25.10), CVA (cerebral vascular accident) (RIZ38-YH I63.9), Diplopia (ZDJ38-QT H53.2), Double vision (QOU34-TB H53.2), Headache (PNED [id number]), HLD (hyperlipidemia) (EMV53-XV E78.5), HTN (hypertension) (ABT32-EV I10), Hyperlipidemia (IYU80-XE E78.5), Hypertension (QUM30-MX I10), Meningioma (NNY24-FY D32.9), Prostate CA (UNK97-SM C61), Suspected cerebrovascular accident (CVA) (KDH90-TN R09.89).       Physical Examination   Vital Signs   12/14/2018 11:33 CST     Peripheral Pulse Rate     68 bpm                             Respiratory Rate          18 br/min                             SpO2                      95 %                             Systolic  Blood Pressure   163 mmHg  HI                             Diastolic Blood Pressure  80 mmHg                             Mean Arterial Pressure, Cuff              108 mmHg    12/14/2018 8:00 CST      Heart Rate Monitored      73 bpm                             Oxygen Therapy            Room air    12/14/2018 7:53 CST      Temperature Oral          36.7 DegC                             Temperature Oral (calculated)             98.06 DegF                             Peripheral Pulse Rate     70 bpm                             Respiratory Rate          18 br/min                             SpO2                      94 %                             Systolic Blood Pressure   137 mmHg                             Diastolic Blood Pressure  77 mmHg                             Mean Arterial Pressure, Cuff              97 mmHg     General:  Alert and oriented.    Eye:  Pupils are equal, round and reactive to light, Extraocular movements are intact.    HENT:  Normocephalic.    Neck:  Supple.    Respiratory:  Lungs are clear to auscultation, Respirations are non-labored.    Cardiovascular:  Normal rate, Regular rhythm.    Gastrointestinal:  Soft, Non-tender.    Musculoskeletal:  Normal range of motion, Normal strength, No tenderness, No swelling.    Integumentary:  Warm.    Neurologic:  Alert, Oriented, 3rd nerve palsy.  No other focal neurological deficits.    Psychiatric:  Cooperative.       Hospital Course   Hospital Course   Admitted from: from emergency department.     Admitting diagnosis: Blurry vision (SSS30-SZ H53.8), CAD (coronary artery disease) (QBB82-VV I25.10), CAD (coronary atherosclerotic disease) (DAM05-UH I25.10), CVA (cerebral vascular accident) (BOB16-FH I63.9), Diplopia (VKI86-EN H53.2), Double vision (LHQ76-DL H53.2), Headache (PNED [id number]), HLD (hyperlipidemia) (BJM93-UY E78.5), HTN (hypertension) (YFD29-ZX I10), Hyperlipidemia (NMH41-LW E78.5), Hypertension (UCK47-EX I10),  Meningioma (DKT32-LP D32.9), Prostate CA (CJB52-AO C61), Suspected cerebrovascular accident (CVA) (BSD56-JV R09.89).     Admission disposition: admit to monitored bed.     Length of stay: days  3.     Medical management.     79 yo CM with significant past medical history of CAD status post PCI, HTN, HLD, prostate cancer admitted to hospitalist service on 12/12/2018 with double vision and headache and associated blurry vision ×1 day. Patient went to his ophthalmologist and was instructed to go to ED. Patient is on aspirin at home. He has been off Plavix for almost 3 years. Upon initial evaluation in the ED patient with mildly elevated blood pressure. Otherwise,Hemodynamics stable. Labs pretty unremarkable. CT head was negative. Admitted to hospitalist service and initiated stroke protocol with neurology consult/ MRI/MRA/echocardiogram and carotid ultrasound ordered. Patient was noted to have 3rd nerve palsy.  Stroke protocol continued.  Permissive hypertension allowed.  No significant change in neurological status except for continued diplopia, blurry vision and 3 rd nerve palsy.  Neurology evaluated the patient recommended to continue stroke protocol and await MRI/MRI/ultrasound carotid and echocardiogram.  Echocardiogram unremarkable-neg bubble study.  Ultrasound carotid with no significant stenosis enough for surgical correction (<50 % stenosis b/l.  MRI/MRA came back negative for ischemic CVA.  MRI positive for frontal meningioma, same was found in MRI in 2014.  Given his eye findings there were concerns for myasthenia gravis following which neurology had ordered a myasthenia gravis panel.  This will be followed up as outpatient.  Given that his MRI was negative for ischemic CVA neurology-Dr. Storey recommended to continue low-dose aspirin and DC plavix when  He rounded on the patient on 12/14 as indicated by the nursing staff.  Myasthenia gravis workup will be followed up as outpatient in 2-4 weeks.  Patient was  followed up with neurosurgery- For His Meningioma Almost a Year Back.  Patient was advised to follow-up in 2-4 weeks with neurosurgery in this regard.  Discharge medications per med rec.  Antihypertensives were resumed once MRI was normal and the patient's blood pressure came back to within normal limits after he received his Imdur and amlodipine.  Follow up with primary care physician, neurology and neurosurgery.  Continue low-dose aspirin.  All treatment plans discussed with the patient and he voiced an understanding to everything explained.  Keep scheduled follow-up appointments as described above.  Further to follow from neurology as outpatient after myasthenia panel is available         Discharge Plan   Discharge Summary Plan   Discharge Status: stable.     Discharge instructions given: to patient, to family member spouse.     Discharge disposition: discharge to home.     Prescriptions: per med rec.     DC Time was 36 mnts

## 2022-08-31 ENCOUNTER — HOSPITAL ENCOUNTER (OUTPATIENT)
Dept: RADIOLOGY | Facility: HOSPITAL | Age: 82
Discharge: HOME | End: 2022-08-31
Attending: NURSE PRACTITIONER
Payer: MEDICARE

## 2022-08-31 DIAGNOSIS — J20.9 ACUTE BRONCHITIS, UNSPECIFIED ORGANISM: ICD-10-CM

## 2022-08-31 PROCEDURE — 71046 X-RAY EXAM CHEST 2 VIEWS: CPT | Mod: TC

## 2023-04-25 DIAGNOSIS — J44.89 CHRONIC OBSTRUCTIVE BRONCHITIS: Primary | ICD-10-CM

## 2023-05-04 ENCOUNTER — PROCEDURE VISIT (OUTPATIENT)
Dept: RESPIRATORY THERAPY | Facility: HOSPITAL | Age: 83
End: 2023-05-04
Attending: FAMILY MEDICINE
Payer: MEDICARE

## 2023-05-04 DIAGNOSIS — J44.89 CHRONIC OBSTRUCTIVE BRONCHITIS: ICD-10-CM

## 2023-05-04 PROCEDURE — 94010 BREATHING CAPACITY TEST: CPT

## 2023-05-04 PROCEDURE — 94727 GAS DIL/WSHOT DETER LNG VOL: CPT

## 2023-05-04 PROCEDURE — 94729 DIFFUSING CAPACITY: CPT

## 2023-09-22 DIAGNOSIS — D32.9 BENIGN NEOPLASM OF MENINGES: Primary | ICD-10-CM

## 2023-11-15 ENCOUNTER — OFFICE VISIT (OUTPATIENT)
Dept: NEUROSURGERY | Facility: CLINIC | Age: 83
End: 2023-11-15
Payer: MEDICARE

## 2023-11-15 VITALS
HEART RATE: 45 BPM | DIASTOLIC BLOOD PRESSURE: 70 MMHG | RESPIRATION RATE: 18 BRPM | BODY MASS INDEX: 32.65 KG/M2 | WEIGHT: 196 LBS | SYSTOLIC BLOOD PRESSURE: 145 MMHG | HEIGHT: 65 IN

## 2023-11-15 DIAGNOSIS — M47.12 CERVICAL SPONDYLOSIS WITH MYELOPATHY: ICD-10-CM

## 2023-11-15 DIAGNOSIS — D32.9 BENIGN NEOPLASM OF MENINGES: Primary | ICD-10-CM

## 2023-11-15 PROCEDURE — 3078F PR MOST RECENT DIASTOLIC BLOOD PRESSURE < 80 MM HG: ICD-10-PCS | Mod: CPTII,,, | Performed by: PHYSICIAN ASSISTANT

## 2023-11-15 PROCEDURE — 3077F SYST BP >= 140 MM HG: CPT | Mod: CPTII,,, | Performed by: PHYSICIAN ASSISTANT

## 2023-11-15 PROCEDURE — 99213 PR OFFICE/OUTPT VISIT, EST, LEVL III, 20-29 MIN: ICD-10-PCS | Mod: ,,, | Performed by: PHYSICIAN ASSISTANT

## 2023-11-15 PROCEDURE — 1160F RVW MEDS BY RX/DR IN RCRD: CPT | Mod: CPTII,,, | Performed by: PHYSICIAN ASSISTANT

## 2023-11-15 PROCEDURE — 1159F MED LIST DOCD IN RCRD: CPT | Mod: CPTII,,, | Performed by: PHYSICIAN ASSISTANT

## 2023-11-15 PROCEDURE — 3288F PR FALLS RISK ASSESSMENT DOCUMENTED: ICD-10-PCS | Mod: CPTII,,, | Performed by: PHYSICIAN ASSISTANT

## 2023-11-15 PROCEDURE — 99213 OFFICE O/P EST LOW 20 MIN: CPT | Mod: ,,, | Performed by: PHYSICIAN ASSISTANT

## 2023-11-15 PROCEDURE — 1101F PT FALLS ASSESS-DOCD LE1/YR: CPT | Mod: CPTII,,, | Performed by: PHYSICIAN ASSISTANT

## 2023-11-15 PROCEDURE — 3078F DIAST BP <80 MM HG: CPT | Mod: CPTII,,, | Performed by: PHYSICIAN ASSISTANT

## 2023-11-15 PROCEDURE — 3288F FALL RISK ASSESSMENT DOCD: CPT | Mod: CPTII,,, | Performed by: PHYSICIAN ASSISTANT

## 2023-11-15 PROCEDURE — 3077F PR MOST RECENT SYSTOLIC BLOOD PRESSURE >= 140 MM HG: ICD-10-PCS | Mod: CPTII,,, | Performed by: PHYSICIAN ASSISTANT

## 2023-11-15 PROCEDURE — 1101F PR PT FALLS ASSESS DOC 0-1 FALLS W/OUT INJ PAST YR: ICD-10-PCS | Mod: CPTII,,, | Performed by: PHYSICIAN ASSISTANT

## 2023-11-15 PROCEDURE — 1160F PR REVIEW ALL MEDS BY PRESCRIBER/CLIN PHARMACIST DOCUMENTED: ICD-10-PCS | Mod: CPTII,,, | Performed by: PHYSICIAN ASSISTANT

## 2023-11-15 PROCEDURE — 1159F PR MEDICATION LIST DOCUMENTED IN MEDICAL RECORD: ICD-10-PCS | Mod: CPTII,,, | Performed by: PHYSICIAN ASSISTANT

## 2023-11-15 RX ORDER — CLOPIDOGREL BISULFATE 75 MG/1
75 TABLET ORAL DAILY
COMMUNITY

## 2023-11-15 RX ORDER — CELECOXIB 200 MG/1
200 CAPSULE ORAL DAILY
COMMUNITY

## 2023-11-15 RX ORDER — ATORVASTATIN CALCIUM 40 MG/1
40 TABLET, FILM COATED ORAL NIGHTLY
COMMUNITY

## 2023-11-15 RX ORDER — ERGOCALCIFEROL 1.25 MG/1
CAPSULE ORAL
COMMUNITY
Start: 2023-11-06

## 2023-11-15 RX ORDER — AMLODIPINE BESYLATE 5 MG/1
5 TABLET ORAL 2 TIMES DAILY
COMMUNITY

## 2023-11-15 NOTE — PROGRESS NOTES
[facility]  History & Physical  Neurosurgery      Anshu Mcguire   [id number]   1940       SUBJECTIVE:     Chief Complaint:   Numbness in arms and hands.      History of Present Illness:   Patient is a 83 y.o. right handed male is seen today in follow-up in regards to meningioma.  The patient was found to have a meningioma incidentally after he had a fall in October of 2021.  He was taken to the [facility] emergency department in [place].  CT of the head was performed on 10/10/2021 revealing left partially calcified parafalcine meningioma.  This was previously seen on prior brain imaging from 2018 and found to be stable.  He returns today with serial imaging.    Overall, the patient reports he is doing well.  Cognitively he is not having difficulty.  There has been no confusion.  He continues with numbness in his arms and hands.  He is experiencing difficulty with dexterity.  His legs are weak.  He has difficulties with his balance.  Yet, his symptoms are at a plateau.  He does have cervical stenosis, but he is not interested in surgery.      Past Medical History:   Diagnosis Date    Benign neoplasm of meninges     CAD (coronary artery disease)     Cervical spondylosis with myelopathy     DM (diabetes mellitus)     History of myocardial infarction     HLD (hyperlipidemia)     HTN (hypertension)     Prostate cancer     Thyroid nodule     Unspecified osteoarthritis, unspecified site         Past Surgical History:   Procedure Laterality Date    APPENDECTOMY      C4-5 ACDF  08/05/2016    GEORGES    CARPAL TUNNEL RELEASE Right     CATARACT EXTRACTION Right     CHOLECYSTECTOMY      PROSTATECTOMY      STENT, CORONARY, MULTI VESSEL  03/2020    TONSILLECTOMY          Social History     Tobacco Use    Smoking status: Every Day    Smokeless tobacco: Current     Types: Chew   Substance Use Topics    Alcohol use: Yes        Family History   Problem Relation Age of Onset    Diabetes Mother     Heart disease Mother      "Heart disease Father     Childhood respiratory disease Brother         Review of patient's allergies indicates:  No Known Allergies     Current Outpatient Medications   Medication Instructions    amLODIPine (NORVASC) 5 MG tablet Oral, Daily    atorvastatin (LIPITOR) 40 MG tablet Oral, Daily    celecoxib (CELEBREX) 200 MG capsule Oral, Daily    clopidogreL (PLAVIX) 75 mg tablet Oral, Daily    ergocalciferol (ERGOCALCIFEROL) 50,000 unit Cap Oral, Every 7 days        Review of Systems   Constitutional:  Positive for fatigue. Negative for chills and fever.   HENT:  Negative for nosebleeds and sore throat.    Eyes:  Negative for pain and visual disturbance.   Respiratory:  Negative for cough, chest tightness and shortness of breath.    Cardiovascular:  Negative for chest pain.   Gastrointestinal:  Negative for diarrhea, nausea and vomiting.   Genitourinary:  Negative for difficulty urinating, dysuria and hematuria.   Musculoskeletal:  Positive for back pain and neck pain. Negative for gait problem and myalgias.   Skin:  Negative for rash.   Neurological:  Positive for weakness and numbness. Negative for dizziness, facial asymmetry and headaches.   Psychiatric/Behavioral:  Negative for confusion and sleep disturbance. The patient is not nervous/anxious.        OBJECTIVE:       Visit Vitals  BP (!) 145/70 (BP Location: Right arm, Patient Position: Sitting)   Pulse (!) 45   Resp 18   Ht 5' 5" (1.651 m)   Wt 88.9 kg (196 lb)   BMI 32.62 kg/m²        General:  Pleasant, Well-nourished, Well-groomed.    Head:  Normocephalic without any obvious abnormality. Atraumatic     Lungs:  Breathing is quiet with non-labored respirations.    Neurological:   Oriented to Person, Place, Time   Speech: normal  Memory, cognition, and affect are appropriate.  Pupils are equal, round, and reactive to light.  Extraocular movements are intact.  Movements of facial expression are intact and symmetric.  He is moving extremities relatively " well.  Gait is unsteady, using rolling walker.      Imaging:  All pertinent neuroimaging independently reviewed. Discussed these findings in detail with the patient.      ASSESSMENT:     1. Benign neoplasm of meninges        2. Cervical spondylosis with myelopathy          PLAN:     Options were discussed at length with the patient and his wife.  MRI of the brain with and without contrast from 10/30/2023 shows stable left parafalcine meningioma.  The patient is 83 years old.  The meningioma has been followed since 2018.  He would like to no longer follow the meningioma with serial imaging.  In addition, he understands he has cervical stenosis.  However, both he and his wife feel his symptoms are at a plateau.  He was advised that he should contact our office if he sees a worsening in his symptoms.  Both he and his wife voiced understanding.  He will return for follow-up on an as-needed basis.      A total of 13 minutes was spent face-to-face with the patient during this encounter.  Over half of that time was spent on counseling and coordination of care.  An additional 10 minutes were used to review the patient's chart, including MRI brain imaging and report, compare with old MRIs, and work on office note.      Chana Jimenez PA-C       Disclaimer:  This note is prepared using voice recognition software and as such is likely to have errors despite attempts at proofreading.

## 2024-01-09 PROBLEM — R06.02 SHORTNESS OF BREATH: Status: ACTIVE | Noted: 2024-01-09

## 2024-02-19 PROBLEM — D32.9 BENIGN NEOPLASM OF MENINGES: Status: ACTIVE | Noted: 2024-02-19

## 2024-02-20 PROBLEM — E78.5 HYPERLIPIDEMIA: Status: ACTIVE | Noted: 2024-02-20

## 2024-02-20 PROBLEM — R06.1 STRIDOR: Status: ACTIVE | Noted: 2024-02-20
